# Patient Record
Sex: MALE | NOT HISPANIC OR LATINO | Employment: FULL TIME | URBAN - METROPOLITAN AREA
[De-identification: names, ages, dates, MRNs, and addresses within clinical notes are randomized per-mention and may not be internally consistent; named-entity substitution may affect disease eponyms.]

---

## 2020-03-02 ENCOUNTER — OFFICE VISIT - HEALTHEAST (OUTPATIENT)
Dept: FAMILY MEDICINE | Facility: CLINIC | Age: 40
End: 2020-03-02

## 2020-03-02 DIAGNOSIS — E66.3 OVERWEIGHT: ICD-10-CM

## 2020-03-02 DIAGNOSIS — Z00.00 ROUTINE MEDICAL EXAM: ICD-10-CM

## 2020-03-02 DIAGNOSIS — Z13.220 SCREENING, LIPID: ICD-10-CM

## 2020-03-02 DIAGNOSIS — Z23 NEED FOR TETANUS BOOSTER: ICD-10-CM

## 2020-03-02 DIAGNOSIS — Z13.1 SCREENING FOR DIABETES MELLITUS: ICD-10-CM

## 2020-03-02 LAB
ANION GAP SERPL CALCULATED.3IONS-SCNC: 7 MMOL/L (ref 5–18)
BUN SERPL-MCNC: 17 MG/DL (ref 8–22)
CALCIUM SERPL-MCNC: 9.7 MG/DL (ref 8.5–10.5)
CHLORIDE BLD-SCNC: 105 MMOL/L (ref 98–107)
CHOLEST SERPL-MCNC: 222 MG/DL
CO2 SERPL-SCNC: 28 MMOL/L (ref 22–31)
CREAT SERPL-MCNC: 0.93 MG/DL (ref 0.7–1.3)
FASTING STATUS PATIENT QL REPORTED: YES
GFR SERPL CREATININE-BSD FRML MDRD: >60 ML/MIN/1.73M2
GLUCOSE BLD-MCNC: 95 MG/DL (ref 70–125)
HDLC SERPL-MCNC: 43 MG/DL
LDLC SERPL CALC-MCNC: 131 MG/DL
POTASSIUM BLD-SCNC: 4.8 MMOL/L (ref 3.5–5)
SODIUM SERPL-SCNC: 140 MMOL/L (ref 136–145)
TRIGL SERPL-MCNC: 240 MG/DL

## 2020-03-02 RX ORDER — OMEPRAZOLE 10 MG/1
10 CAPSULE, DELAYED RELEASE ORAL
Status: SHIPPED | COMMUNITY
Start: 2020-03-02

## 2020-03-02 ASSESSMENT — PATIENT HEALTH QUESTIONNAIRE - PHQ9: SUM OF ALL RESPONSES TO PHQ QUESTIONS 1-9: 3

## 2020-03-02 ASSESSMENT — ANXIETY QUESTIONNAIRES
3. WORRYING TOO MUCH ABOUT DIFFERENT THINGS: NOT AT ALL
5. BEING SO RESTLESS THAT IT IS HARD TO SIT STILL: NOT AT ALL
4. TROUBLE RELAXING: SEVERAL DAYS
2. NOT BEING ABLE TO STOP OR CONTROL WORRYING: NOT AT ALL
IF YOU CHECKED OFF ANY PROBLEMS ON THIS QUESTIONNAIRE, HOW DIFFICULT HAVE THESE PROBLEMS MADE IT FOR YOU TO DO YOUR WORK, TAKE CARE OF THINGS AT HOME, OR GET ALONG WITH OTHER PEOPLE: SOMEWHAT DIFFICULT
6. BECOMING EASILY ANNOYED OR IRRITABLE: SEVERAL DAYS
GAD7 TOTAL SCORE: 3
1. FEELING NERVOUS, ANXIOUS, OR ON EDGE: SEVERAL DAYS
7. FEELING AFRAID AS IF SOMETHING AWFUL MIGHT HAPPEN: NOT AT ALL

## 2020-03-02 ASSESSMENT — MIFFLIN-ST. JEOR: SCORE: 1767.22

## 2020-03-03 ENCOUNTER — COMMUNICATION - HEALTHEAST (OUTPATIENT)
Dept: FAMILY MEDICINE | Facility: CLINIC | Age: 40
End: 2020-03-03

## 2021-05-27 ASSESSMENT — PATIENT HEALTH QUESTIONNAIRE - PHQ9: SUM OF ALL RESPONSES TO PHQ QUESTIONS 1-9: 3

## 2021-05-28 ASSESSMENT — ANXIETY QUESTIONNAIRES: GAD7 TOTAL SCORE: 3

## 2021-06-04 VITALS
BODY MASS INDEX: 33.99 KG/M2 | WEIGHT: 204 LBS | DIASTOLIC BLOOD PRESSURE: 80 MMHG | SYSTOLIC BLOOD PRESSURE: 110 MMHG | HEART RATE: 84 BPM | OXYGEN SATURATION: 97 % | HEIGHT: 65 IN

## 2021-06-20 NOTE — LETTER
Letter by Elpidio Raymond MD at      Author: Elpidio Raymond MD Service: -- Author Type: --    Filed:  Encounter Date: 3/3/2020 Status: (Other)         Mika Gleason  [address] [facility] [address]   March 3, 2020     Dear Mr. Gleason,    Below are the results from your recent visit:  Resulted Orders   Basic Metabolic Panel   Result Value Ref Range    Sodium 140 136 - 145 mmol/L    Potassium 4.8 3.5 - 5.0 mmol/L    Chloride 105 98 - 107 mmol/L    CO2 28 22 - 31 mmol/L    Anion Gap, Calculation 7 5 - 18 mmol/L    Glucose 95 70 - 125 mg/dL    Calcium 9.7 8.5 - 10.5 mg/dL    BUN 17 8 - 22 mg/dL    Creatinine 0.93 0.70 - 1.30 mg/dL    GFR MDRD Af Amer >60 >60 mL/min/1.73m2    GFR MDRD Non Af Amer >60 >60 mL/min/1.73m2    Narrative    Fasting Glucose reference range is 70-99 mg/dL per  American Diabetes Association (ADA) guidelines.   Lipid Profile   Result Value Ref Range    Triglycerides 240 (H) <=149 mg/dL    Cholesterol 222 (H) <=199 mg/dL    LDL Calculated 131 (H) <=129 mg/dL    HDL Cholesterol 43 >=40 mg/dL    Patient Fasting > 8hrs? Yes     Your kidney function tests are normal, and your blood sugar level is also normal.    Your LDL (bad) cholesterol is slightly up, but not terrible, and your triglycerides are also elevated.  Your  HDL (good) cholesterol is good.  I would suggest you work on a healthy diet, with lower fat and  cholesterol, and fewer carbs, and increase your exercise, ideally, up to 150 minutes a week.  Let's  recheck at your next physical.     Please call with questions or contact us using [employer].    Sincerely,      Electronically signed by Elpidio Raymond MD

## 2021-06-28 NOTE — PROGRESS NOTES
Progress Notes by Vlad Bowden MD at 3/2/2020  8:00 AM     Author: Vlad Bowden MD Service: -- Author Type: Physician    Filed: 3/2/2020  9:46 AM Encounter Date: 3/2/2020 Status: Signed    : Vlad Bowden MD (Physician)       MALE PREVENTATIVE EXAM    Assessment and Plan:       1. Routine medical exam    Generally the patient is doing very well.  We discussed healthy lifestyles, including adequate exercise (3-4 times a week for 20-30 minutes), and a healthy diet.  Patient should return for annual physicals, and we can also see them here as needed.       2. Overweight    We discussed trying to watch what he eats a bit better, decreasing carbs, and possibly decreasing his alcohol intake which may be contributing to some caloric intake.  He would like to try to get down to where he was about a couple of years ago at 185 or so.  He thinks that his sedentary job is contributing to it and I told him he will need to counteract that with other methods of getting exercise and.    3. Need for tetanus booster    - Tdap vaccine,  8yo or older,  IM    4. Screening, lipid    - Lipid Profile    5. Screening for diabetes mellitus    - Basic Metabolic Panel        Next follow up:  No follow-ups on file.    Immunization Review  Adult Imm Review: Due today, orders placed  BMI: 33      I discussed the following with the patient:   Adult Healthy Living: Importance of regular exercise  Healthy nutrition  Getting adequate sleep  Stress management  Use of seat belts        Subjective:   Chief Complaint: Mika Gleason is an 39 y.o. male here for a preventative health visit.     HPI: New patient to our clinic was here today for a physical.  He is generally doing quite well.  He has not been for probably about 5 or 6 years.  He thinks that he might be due for a tetanus shot today.    He is fasting and would like to get some blood work done who including cholesterol and blood sugar screening etc.    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  He has put on about 20 pounds and would like to just discuss ways that he could try to drop some more weight.    Healthy Habits  Are you taking a daily aspirin? No  Do you typically exercising at least 40 min, 3-4 times per week?  NO  Do you usually eat at least 4 servings of fruit and vegetables a day, include whole grains and fiber and avoid regularly eating high fat foods? Yes  Have you had an eye exam in the past two years? NO  Do you see a dentist twice per year? NO  Do you have any concerns regarding sleep? YES, snores - maybe a sleep study    Safety Screen  If you own firearms, are they secured in a locked gun cabinet or with trigger locks? The patient declines to answer  No data recorded    Review of Systems:  Please see above.  The rest of the review of systems are negative for all systems.     Cancer Screening     Patient has no health maintenance due at this time          Patient Care Team:  Elpidio Raymond MD as PCP - General (Family Medicine)        History     Reviewed By Date/Time Sections Reviewed    Vlad Bowden MD 3/2/2020  8:39 AM Medical, Surgical    Vlad Bowden MD 3/2/2020  8:18 AM Medical, Surgical, Tobacco, Alcohol, Drug Use, Sexual Activity, Family    Serina Victor CMA 3/2/2020  8:06 AM Tobacco, Alcohol, Drug Use, Sexual Activity        Patient is new patient to the clinic. Please see past medical history, surgical history, social history and family history, all of which were completed in their entirety today.     Objective:   Vital Signs:   Visit Vitals  /80 (Patient Site: Left Arm, Patient Position: Sitting, Cuff Size: Adult Large)   Pulse 84   Ht 5' 5\" (1.651 m)   Wt 204 lb (92.5 kg)   SpO2 97%   BMI 33.95 kg/m           PHYSICAL EXAM    Well developed, well nourished, no acute " distress.  HEENT: normocephalic/atraumatic, PERRLA/EOMI, TMs: Gray, normal light reflex, no nasal discharge.  Oral mucosa: no erythema/exudate  Neck: No LAD/masses/thyromegaly/bruits  Lungs: clear bilaterally  Heart: regular rate and rhythm, no murmurs/gallops/rubs.  Abdomen: Normal bowel sounds, soft, non-tender, non-distended, no masses, neg Seo's/McBurney's, no rebound/guarding  Genital: Bilaterally descended testes, no masses, non-tender, no hernia.  No urethral discharge or erythema.  No lesions, normal phallus.  Lymphatics: no supraclavicular/axillary/epitrochlear/inguinal LAD. No edema.  Neuro: A&O x 3, CN II-XII intact, strength 5/5, reflexes symmetric, sensory intact to light touch.  Psych: Behavior appropriate, engaging.  Thought processes congruent, non-tangential.  Musculoskeletal: no gross deformities.  Skin: no rashes or lesions.            Medication List          Accurate as of March 2, 2020  9:46 AM. If you have any questions, ask your nurse or doctor.            CONTINUE taking these medications    multivitamin therapeutic tablet  INSTRUCTIONS:  Take 1 tablet by mouth daily.        omeprazole 10 MG capsule  Also known as:  PriLOSEC  INSTRUCTIONS:  Take 10 mg by mouth daily before breakfast.               Additional Screenings Completed Today:

## 2024-05-16 ENCOUNTER — NURSE TRIAGE (OUTPATIENT)
Dept: NURSING | Facility: CLINIC | Age: 44
End: 2024-05-16
Payer: COMMERCIAL

## 2024-05-16 NOTE — TELEPHONE ENCOUNTER
S-(situation): fever and abdominal pain    B-(background):   Symptoms started yesterday    A-(assessment):   Constant hypogastric pain since  yesterday, ranges from mild to moderate  Feels bloated and gassy  Fever of 102F yesterday. Takes Tylenol which helps lower fever.  No dysuria  No vomiting      R-(recommendations):  Advised [facility] today.    Pt verbalized understanding.    Mari Keita RN/[place] Nurse Advisor      Reason for Disposition   MILD TO MODERATE constant pain lasting > 2 hours    Additional Information   Negative: Shock suspected (e.g., cold/pale/clammy skin, too weak to stand, low BP, rapid pulse)   Negative: Passed out (i.e., fainted, collapsed and was not responding)   Negative: Sounds like a life-threatening emergency to the triager   Negative: Followed an abdomen (stomach) injury   Negative: Chest pain   Negative: Pain is mainly in upper abdomen (if needed ask: 'is it mainly above the belly button?')   Negative: Abdomen bloating or swelling are main symptoms   Negative: SEVERE abdominal pain (e.g., excruciating)   Negative: Vomiting red blood or black (coffee ground) material   Negative: Blood in bowel movements  (Exception: Blood on surface of BM with constipation.)   Negative: Black or tarry bowel movements  (Exception: Chronic-unchanged black-grey BMs AND is taking iron pills or Pepto-Bismol.)   Negative: Unable to urinate (or only a few drops) and bladder feels very full   Negative: Pain in scrotum persists > 1 hour    Protocols used: Abdominal Pain - Male-A-[place]

## 2024-06-04 ENCOUNTER — OFFICE VISIT (OUTPATIENT)
Dept: FAMILY MEDICINE | Facility: CLINIC | Age: 44
End: 2024-06-04
Payer: COMMERCIAL

## 2024-06-04 VITALS
RESPIRATION RATE: 18 BRPM | TEMPERATURE: 98.9 F | HEART RATE: 100 BPM | OXYGEN SATURATION: 96 % | DIASTOLIC BLOOD PRESSURE: 90 MMHG | SYSTOLIC BLOOD PRESSURE: 124 MMHG

## 2024-06-04 DIAGNOSIS — B34.9 VIRAL ILLNESS: Primary | ICD-10-CM

## 2024-06-04 LAB
DEPRECATED S PYO AG THROAT QL EIA: NEGATIVE
GROUP A STREP BY PCR: NOT DETECTED

## 2024-06-04 PROCEDURE — 99203 OFFICE O/P NEW LOW 30 MIN: CPT | Performed by: PHYSICIAN ASSISTANT

## 2024-06-04 PROCEDURE — 87651 STREP A DNA AMP PROBE: CPT | Performed by: PHYSICIAN ASSISTANT

## 2024-06-04 NOTE — PATIENT INSTRUCTIONS
You were seen today for acute bronchitis. This is likely due to a viral illness.    Symptom management:  - Get plenty of rest  - Avoid smoking and second hand smoke  - May take tylenol or ibuprofen for fever/discomfort  - Drink plenty of non-caffeinated fluids  - Use nasal steroid spray for sinus congestion  - Albuterol inhaler may be used every 6 hours as needed for chest tightness      Reasons to be seen in the emergency room:  - Develop a fever of 100.4 or higher  - Cough changes, coughing up blood, or become short of breath  - Neck stiffness  - Chest pain  - Severe headache  - Unable to tolerate eating or drinking fluids    Otherwise, if no symptom improvement after 5 days, follow-up with your primary care provider.

## 2024-06-04 NOTE — PROGRESS NOTES
Patient presents with:  Fever: Started with fever last night 102 was vomiting last night headache      Clinical Decision Making:  Strep test was obtained and was negative.  Culture is to follow.  COVID-19 screening test is pending.  Symptomatic care was gone over. Expected course of resolution and indication for return was gone over and questions were answered to patient/parent's satisfaction before discharge.        ICD-10-CM    1. Viral illness  B34.9 Streptococcus A Rapid Screen w/Reflex to PCR - Clinic Collect     Group A Streptococcus PCR Throat Swab          Patient Instructions   You were seen today for acute bronchitis. This is likely due to a viral illness.    Symptom management:  - Get plenty of rest  - Avoid smoking and second hand smoke  - May take tylenol or ibuprofen for fever/discomfort  - Drink plenty of non-caffeinated fluids  - Use nasal steroid spray for sinus congestion  - Albuterol inhaler may be used every 6 hours as needed for chest tightness      Reasons to be seen in the emergency room:  - Develop a fever of 100.4 or higher  - Cough changes, coughing up blood, or become short of breath  - Neck stiffness  - Chest pain  - Severe headache  - Unable to tolerate eating or drinking fluids    Otherwise, if no symptom improvement after 5 days, follow-up with your primary care provider.         HPI:  Mika Gleason is a 43 year old male who presents today one day acute onset of sore throat and odynophagia and fever of 102 taken at home.  Patient did not have antipyretic today and temperature in the office is markedly improved mahsa 98.9.  Patient also had a cough, 1 episode of vomiting, but no diarrhea, sore throat, shortness of breath pleuritic chest pain syncope or presyncope.  Patient denies fever, chills, night sweats, fatigue, vomiting, diarrhea, skin rash, abdominal pain or urinary symptoms.  No known exposure to COVID or flu.    No known sick contacts for strep throat.    Has not tried treatment  for this over-the-counter.     History obtained from chart review and the patient.    Problem List:  2014-12: Possible exposure to STD  Overweight      Past Medical History:   Diagnosis Date    GERD (gastroesophageal reflux disease)        Social History     Tobacco Use    Smoking status: Former     Current packs/day: 0.00     Types: Cigarettes     Quit date: 1/1/2014     Years since quitting: 10.4    Smokeless tobacco: Never   Substance Use Topics    Alcohol use: Yes     Comment: Alcoholic Drinks/day: 2-3 drinks per day       Review of Systems  As above in HPI otherwise negative.    Vitals:    06/04/24 1018   BP: (!) 124/90   Pulse: 100   Resp: 18   Temp: 98.9  F (37.2  C)   TempSrc: Oral   SpO2: 96%       General: Patient is resting comfortably no acute distress is afebrile  HEENT: Head is normocephalic atraumatic   eyes are PERRL EOMI sclera anicteric   TMs are clear bilaterally  Throat is with mild pharyngeal wall erythema and no exudate  No cervical lymphadenopathy present  LUNGS: Clear to auscultation bilaterally  HEART: Regular rate and rhythm  Skin: Without rash non-diaphoretic    Physical Exam      Labs:  Results for orders placed or performed in visit on 06/04/24   Streptococcus A Rapid Screen w/Reflex to PCR - Clinic Collect     Status: Normal    Specimen: Throat; Swab   Result Value Ref Range    Group A Strep antigen Negative Negative     At the end of the encounter, I discussed results, diagnosis, medications. Discussed red flags for immediate return to clinic/ER, as well as indications for follow up if no improvement. Patient understood and agreed to plan. Patient was stable for discharge.

## 2024-06-04 NOTE — LETTER
June 4, 2024      Mika Gleason  [address] [facility] [id number]        To Whom It May Concern:    Mika Gleason was seen in our clinic. He may return to work without restrictions 6/5/24 .      Sincerely,        Anastacio Rojas PA-C